# Patient Record
Sex: FEMALE | Race: WHITE | Employment: UNEMPLOYED | URBAN - METROPOLITAN AREA
[De-identification: names, ages, dates, MRNs, and addresses within clinical notes are randomized per-mention and may not be internally consistent; named-entity substitution may affect disease eponyms.]

---

## 2021-07-13 ENCOUNTER — HOSPITAL ENCOUNTER (OUTPATIENT)
Age: 26
Discharge: HOME | End: 2021-07-13
Attending: PHYSICIAN ASSISTANT

## 2021-07-13 ENCOUNTER — APPOINTMENT (OUTPATIENT)
Dept: GENERAL RADIOLOGY | Age: 26
End: 2021-07-13
Attending: PHYSICIAN ASSISTANT

## 2021-07-13 VITALS
RESPIRATION RATE: 16 BRPM | OXYGEN SATURATION: 98 % | HEART RATE: 66 BPM | TEMPERATURE: 98 F | SYSTOLIC BLOOD PRESSURE: 110 MMHG | DIASTOLIC BLOOD PRESSURE: 76 MMHG

## 2021-07-13 DIAGNOSIS — J01.90 ACUTE NON-RECURRENT SINUSITIS, UNSPECIFIED LOCATION: Primary | ICD-10-CM

## 2021-07-13 DIAGNOSIS — R05.9 COUGH: ICD-10-CM

## 2021-07-13 PROCEDURE — 99203 OFFICE O/P NEW LOW 30 MIN: CPT | Performed by: PHYSICIAN ASSISTANT

## 2021-07-13 PROCEDURE — 71046 X-RAY EXAM CHEST 2 VIEWS: CPT | Performed by: PHYSICIAN ASSISTANT

## 2021-07-13 RX ORDER — AMOXICILLIN AND CLAVULANATE POTASSIUM 875; 125 MG/1; MG/1
1 TABLET, FILM COATED ORAL 2 TIMES DAILY
Qty: 14 TABLET | Refills: 0 | Status: SHIPPED | OUTPATIENT
Start: 2021-07-13 | End: 2021-07-20

## 2021-07-13 NOTE — ED PROVIDER NOTES
Patient Seen in: [facility]    History   Patient presents with:  Sinus Problem  Cough/URI    Stated Complaint: Sinus Problem    HPI    25-year-old female presents with chief complaint of nasal congestion. Onset 1 month ago.   Patient reports Problem  Other systems are as noted in HPI. Constitutional and vital signs reviewed. All other systems reviewed and negative except as noted above. PSFH elements reviewed from today and agreed except as otherwise stated in HPI.     Physical Exam and dry. No obvious bruising. No obvious rash. ED Course   Labs Reviewed - No data to display    MDM     EMR encounter on 12/10/2020, 5/19/2021 and 7/12/2021 reviewed.     Radiology findings: XR CHEST PA + LAT CHEST (CPT=71046)    Result Date: 7/13/202 0

## 2023-01-21 ENCOUNTER — HOSPITAL ENCOUNTER (OUTPATIENT)
Age: 28
Discharge: HOME | End: 2023-01-21
Payer: MEDICAID

## 2023-01-21 VITALS
DIASTOLIC BLOOD PRESSURE: 60 MMHG | OXYGEN SATURATION: 98 % | RESPIRATION RATE: 20 BRPM | SYSTOLIC BLOOD PRESSURE: 115 MMHG | TEMPERATURE: 99 F | HEART RATE: 95 BPM

## 2023-01-21 DIAGNOSIS — J02.9 VIRAL PHARYNGITIS: Primary | ICD-10-CM

## 2023-01-21 LAB
S PYO AG THROAT QL: NEGATIVE
SARS-COV-2 RNA RESP QL NAA+PROBE: NOT DETECTED

## 2024-11-16 ENCOUNTER — TELEPHONE (OUTPATIENT)
Dept: OBGYN CLINIC | Facility: CLINIC | Age: 29
End: 2024-11-16

## 2024-11-20 ENCOUNTER — OFFICE VISIT (OUTPATIENT)
Dept: OBGYN CLINIC | Facility: CLINIC | Age: 29
End: 2024-11-20

## 2024-11-20 VITALS
BODY MASS INDEX: 21.75 KG/M2 | DIASTOLIC BLOOD PRESSURE: 62 MMHG | WEIGHT: 118.19 LBS | SYSTOLIC BLOOD PRESSURE: 102 MMHG | HEIGHT: 62 IN

## 2024-11-20 DIAGNOSIS — Z01.419 ENCOUNTER FOR ANNUAL ROUTINE GYNECOLOGICAL EXAMINATION: Primary | ICD-10-CM

## 2024-11-20 PROCEDURE — 99385 PREV VISIT NEW AGE 18-39: CPT | Performed by: OBSTETRICS & GYNECOLOGY

## 2024-11-20 NOTE — H&P
[facility]  Obstetrics and Gynecology  History & Physical    CC:   Chief Complaint   Patient presents with    Annual        Subjective:     HPI: Alicia Mcallister is a 29 year old  female here for a well women exam. Patient reports normal monthly menses. No excessive pain before during or after. Has right breast concerning sign. Never sexually active. Never had pap. Declined pelvic exam and pap today given on menses. Recommend returning for exam when not on menses. Recommend pap smear.      OB History:  OB History    Para Term  AB Living   0 0 0 0 0 0   SAB IAB Ectopic Multiple Live Births   0 0 0 0 0       Gyne History:  Hx Prior Abnormal Pap: No  Pap Result Notes: pt never had pap  Patient's last menstrual period was 2024 (exact date).    Meds:  Medications Ordered Prior to Encounter[1]    All:  Allergies[2]    PMH:  Past Medical History:    Anxiety    Coloboma of eye    bilateral    Dysconjugate gaze    Hydrocephalus (HCC)       Immunization History:   Immunization History   Administered Date(s) Administered    Covid-19 Vaccine Pfizer 30 mcg/0.3 ml 2021, 2021    DTAP 1994, 1995, 1995, 10/16/1995, 10/21/1999    Hib, Unspecified Formulation 1994, 1994, 1995, 1995, 10/16/1995    IPV 1994, 1994, 1995, 10/21/1999    Influenza 10/18/2017    MMR 1995, 10/21/1999    TDAP 2019       PSH:  Past Surgical History:   Procedure Laterality Date    Cholecystectomy      Other surgical history       shunt for hydrocephalus       Social History:  Social History     Socioeconomic History    Marital status: Single     Spouse name: Not on file    Number of children: 0    Years of education: Not on file    Highest education level: Not on file   Occupational History    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   Tobacco Use    Smoking status: Never    Smokeless tobacco: Never   Vaping Use    Vaping status:  Never Used   Substance and Sexual Activity    Alcohol use: Not Currently    Drug use: Never    Sexual activity: Never     Partners: Male   Other Topics Concern     Service Not Asked    Blood Transfusions No    Caffeine Concern Not Asked    Occupational Exposure Not Asked    Hobby Hazards Not Asked    Sleep Concern Not Asked    Stress Concern Not Asked    Weight Concern Not Asked    Special Diet Not Asked    Back Care Not Asked    Exercise Not Asked    Bike Helmet Not Asked    Seat Belt Not Asked    Self-Exams Not Asked   Social History Narrative    Not on file     Social Drivers of Health     Financial Resource Strain: Not on file   Food Insecurity: Not on file   Transportation Needs: Not on file   Physical Activity: Not on file   Stress: Not on file   Social Connections: Not on file   Housing Stability: Not on file         Patient feels unsafe or threatened?: No  Family History:  Family History   Problem Relation Age of Onset    Hypertension Mother     Heart Disease Maternal Grandmother     Heart Disease Maternal Grandfather        Health maintenance:  Mammogram: Due at 39.   Colonoscopy: Due at 44.     Review of Systems:  General: no complaints  Eyes: no complaints  Respiratory: no complaints  Cardiovascular: no complaints  GI: no complaints  : See HPI  Hematological/lymphatic: no complaints  Breast: See HPI  Psychiatric: no complaints  Endocrine:no complaints  Neurological: no complaints  Immunological: no complaints  Musculoskeletal:no complaints      Objective:     Vitals:    11/20/24 1139   BP: 102/62   Weight: 118 lb 3.2 oz (53.6 kg)   Height: 62\"         Body mass index is 21.62 kg/m².    Exam with MARCIN Jett present:   GENERAL: well developed, well nourished, in no apparent distress, alert and orientated X 3  PSYCH: mood and affect stable   SKIN: no rashes, no lesions  LUNGS: respiration unlabored  CARDIOVASCULAR: no peripheral edema or varicosities, skin warm and dry  BREASTS:  bilaterally nontender, no palpable masses in the left breast, in the right breast there is a small <5mm mobile lump in the upper inner quadrant of the right breast, no nipple discharge, no skin changes, no axillary adenopathy  ABDOMEN: Soft, non distended; non tender, no masses  GYNE/: declined by the patient.   EXTREMITIES:  Normal range of motion, strength 5/5 walking.     Assessment:     Alicia Mcallister is a 29 year old  female here for a well women exam.     Patient Active Problem List   Diagnosis    Anxiety    Coloboma of eye    Hydrocephalus (HCC)    Dysconjugate gaze         Plan:   1. Encounter for annual routine gynecological examination  - Small lump palpated in the medial right breast.   - Recommend repeat exam when comes back for pelvic exam.  - If palpated will Recommend ultrasound and mammogram.   - Pelvic and pap smear when not on menses.     All of the findings and plan were discussed with the patient.  She notes understanding and agrees with the plan of care.  All questions were answered to the best of my ability at this time.      RTC ASAP for pelvic exam or sooner if needed     Dr. Felix Santos MD    [facility]     This note was created by [employer] voice recognition. Errors in content may be related to improper recognition by the system; efforts to review and correct have been done but errors may still exist. Please be advised the primary purpose of this note is for me to communicate medical care. Standard sentence structure is not always used. Medical terminology and medical abbreviations may be used. There may be grammatical, typographical, and automated fill ins that may have errors missed in proofreading.         [1]   Current Outpatient Medications on File Prior to Visit   Medication Sig Dispense Refill    Cetirizine HCl (ZYRTEC OR) Take by mouth.      Sertraline HCl 25 MG Oral Tab Take 25 mg by mouth daily.   (Patient not taking: Reported on 2024)      hydrOXYzine HCl  25 MG Oral Tab hydroxyzine HCl 25 mg tablet   Take 1/2 tablet PO PRN for anxiety (anxious provoking situations) up to three times a day (Patient not taking: Reported on 11/20/2024)       No current facility-administered medications on file prior to visit.   [2] No Known Allergies

## 2024-11-21 ENCOUNTER — TELEPHONE (OUTPATIENT)
Dept: OBGYN CLINIC | Facility: CLINIC | Age: 29
End: 2024-11-21

## 2024-12-10 ENCOUNTER — OFFICE VISIT (OUTPATIENT)
Dept: OBGYN CLINIC | Facility: CLINIC | Age: 29
End: 2024-12-10
Payer: MEDICAID

## 2024-12-10 VITALS
DIASTOLIC BLOOD PRESSURE: 62 MMHG | BODY MASS INDEX: 20.73 KG/M2 | WEIGHT: 112.63 LBS | HEIGHT: 62 IN | SYSTOLIC BLOOD PRESSURE: 110 MMHG

## 2024-12-10 DIAGNOSIS — Z12.4 CERVICAL CANCER SCREENING: Primary | ICD-10-CM

## 2024-12-10 PROCEDURE — 87624 HPV HI-RISK TYP POOLED RSLT: CPT | Performed by: OBSTETRICS & GYNECOLOGY

## 2024-12-10 PROCEDURE — 99214 OFFICE O/P EST MOD 30 MIN: CPT | Performed by: OBSTETRICS & GYNECOLOGY

## 2024-12-10 PROCEDURE — 88175 CYTOPATH C/V AUTO FLUID REDO: CPT | Performed by: OBSTETRICS & GYNECOLOGY

## 2024-12-10 NOTE — PROGRESS NOTES
[facility]  Obstetrics and Gynecology   Follow Up    Subjective:     Alicia Mcallister is a 29 year old  female who presents with c/o   Chief Complaint   Patient presents with    Pap       The patient reports no complaints. Desires pap today. Nervous about collection given she has never had intercourse.     Review of Systems:  General: no complaints  Eyes: no complaints  Respiratory: no complaints  Cardiovascular: no complaints  GI: no complaints  : See HPI  Hematological/lymphatic: no complaints  Breast: no complaints  Psychiatric: no complaints  Endocrine:no complaints  Neurological: no complaints  Immunological: no complaints  Musculoskeletal:no complaints    HISTORY:  Past Medical History:    Anxiety    Coloboma of eye    bilateral    Dysconjugate gaze    Hydrocephalus (HCC)      Past Surgical History:   Procedure Laterality Date    Cholecystectomy      Other surgical history       shunt for hydrocephalus      Family History   Problem Relation Age of Onset    Hypertension Mother     Heart Disease Maternal Grandmother     Heart Disease Maternal Grandfather       Social History     Socioeconomic History    Marital status: Single    Number of children: 0   Occupational History    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    Exam with MARCIN Jett present:   GENERAL: well developed, well nourished, in no apparent distress, alert and orientated X 3  PSYCH: mood and affect stable   SKIN: no rashes, no lesions  LUNGS: respiration  unlabored  CARDIOVASCULAR: no peripheral edema or varicosities, skin warm and dry  GYNE/:   External Genitalia: normal, no lesions, good perineal support  Urethra: meatus normal   Bladder: well supported  Vagina: normal mucosa, no lesions, normal discharge   Cervix: normal os, no lesions or bleeding  Cul-de-sac: normal  R/V: normal perineum, no hemorrhoids  EXTREMITIES:  Normal range of motion, strength 5/5 walking.        Assessment:     Alicia Mcallister is a 29 year old  female who presents for cervical cancer screening.     Patient Active Problem List   Diagnosis    Anxiety    Coloboma of eye    Hydrocephalus (HCC)    Dysconjugate gaze         Plan:     1. Cervical cancer screening  - Normal exam.   - Pap collected.   - ThinPrep PAP Smear; Future  - Hpv Dna  High Risk , Thin Prep Collect; Future  - ThinPrep PAP Smear  - Hpv Dna  High Risk , Thin Prep Collect     All of the findings and plan were discussed with the patient.  She notes understanding and agrees with the plan of care.  All questions were answered to the best of my ability at this time.    RTC in 1 year or sooner if needed    Dr. Felix Santos MD    [facility]     This note was created by [employer] voice recognition. Errors in content may be related to improper recognition by the system; efforts to review and correct have been done but errors may still exist. Please be advised the primary purpose of this note is for me to communicate medical care. Standard sentence structure is not always used. Medical terminology and medical abbreviations may be used. There may be grammatical, typographical, and automated fill ins that may have errors missed in proofreading.

## 2024-12-11 LAB — HPV E6+E7 MRNA CVX QL NAA+PROBE: NEGATIVE
